# Patient Record
Sex: FEMALE | Race: AMERICAN INDIAN OR ALASKA NATIVE
[De-identification: names, ages, dates, MRNs, and addresses within clinical notes are randomized per-mention and may not be internally consistent; named-entity substitution may affect disease eponyms.]

---

## 2020-01-27 LAB
BAND NEUTROPHILS # (MANUAL): 0 K/MM3
HCT VFR BLD CALC: 38 % (ref 30.3–42.9)
HGB BLD-MCNC: 12.6 GM/DL (ref 10.1–14.3)
MCHC RBC AUTO-ENTMCNC: 33 % (ref 30–34)
MCV RBC AUTO: 95 FL (ref 79–97)
MYELOCYTES # (MANUAL): 0 K/MM3
PLATELET # BLD: 342 K/MM3 (ref 140–440)
PROMYELOCYTES # (MANUAL): 0 K/MM3
RBC # BLD AUTO: 4 M/MM3 (ref 3.65–5.03)
TOTAL CELLS COUNTED BLD: 100

## 2020-01-28 ENCOUNTER — HOSPITAL ENCOUNTER (INPATIENT)
Dept: HOSPITAL 5 - 3A | Age: 50
LOS: 1 days | Discharge: HOME | DRG: 743 | End: 2020-01-29
Attending: OBSTETRICS & GYNECOLOGY | Admitting: OBSTETRICS & GYNECOLOGY
Payer: MEDICAID

## 2020-01-28 DIAGNOSIS — D64.9: ICD-10-CM

## 2020-01-28 DIAGNOSIS — D25.1: Primary | ICD-10-CM

## 2020-01-28 PROCEDURE — 86900 BLOOD TYPING SEROLOGIC ABO: CPT

## 2020-01-28 PROCEDURE — 0UT70ZZ RESECTION OF BILATERAL FALLOPIAN TUBES, OPEN APPROACH: ICD-10-PCS | Performed by: OBSTETRICS & GYNECOLOGY

## 2020-01-28 PROCEDURE — 0UT90ZZ RESECTION OF UTERUS, OPEN APPROACH: ICD-10-PCS | Performed by: OBSTETRICS & GYNECOLOGY

## 2020-01-28 PROCEDURE — 80048 BASIC METABOLIC PNL TOTAL CA: CPT

## 2020-01-28 PROCEDURE — 85025 COMPLETE CBC W/AUTO DIFF WBC: CPT

## 2020-01-28 PROCEDURE — 85018 HEMOGLOBIN: CPT

## 2020-01-28 PROCEDURE — 86901 BLOOD TYPING SEROLOGIC RH(D): CPT

## 2020-01-28 PROCEDURE — 84703 CHORIONIC GONADOTROPIN ASSAY: CPT

## 2020-01-28 PROCEDURE — 86850 RBC ANTIBODY SCREEN: CPT

## 2020-01-28 PROCEDURE — 88307 TISSUE EXAM BY PATHOLOGIST: CPT

## 2020-01-28 PROCEDURE — C9250 ARTISS FIBRIN SEALANT: HCPCS

## 2020-01-28 PROCEDURE — 36415 COLL VENOUS BLD VENIPUNCTURE: CPT

## 2020-01-28 PROCEDURE — C1765 ADHESION BARRIER: HCPCS

## 2020-01-28 PROCEDURE — 85014 HEMATOCRIT: CPT

## 2020-01-28 PROCEDURE — 85007 BL SMEAR W/DIFF WBC COUNT: CPT

## 2020-01-28 RX ADMIN — HYDROCODONE BITARTRATE AND ACETAMINOPHEN PRN EACH: 5; 325 TABLET ORAL at 21:29

## 2020-01-28 RX ADMIN — HYDROMORPHONE HYDROCHLORIDE PRN MG: 1 INJECTION, SOLUTION INTRAMUSCULAR; INTRAVENOUS; SUBCUTANEOUS at 12:15

## 2020-01-28 RX ADMIN — HYDROMORPHONE HYDROCHLORIDE PRN MG: 1 INJECTION, SOLUTION INTRAMUSCULAR; INTRAVENOUS; SUBCUTANEOUS at 11:48

## 2020-01-28 RX ADMIN — HYDROMORPHONE HYDROCHLORIDE PRN MG: 1 INJECTION, SOLUTION INTRAMUSCULAR; INTRAVENOUS; SUBCUTANEOUS at 12:26

## 2020-01-28 RX ADMIN — ONDANSETRON PRN MG: 2 INJECTION INTRAMUSCULAR; INTRAVENOUS at 19:48

## 2020-01-28 RX ADMIN — HYDROMORPHONE HYDROCHLORIDE PRN MG: 1 INJECTION, SOLUTION INTRAMUSCULAR; INTRAVENOUS; SUBCUTANEOUS at 12:03

## 2020-01-28 RX ADMIN — KETOROLAC TROMETHAMINE SCH MG: 30 INJECTION, SOLUTION INTRAMUSCULAR at 17:37

## 2020-01-28 RX ADMIN — DOCUSATE SODIUM SCH MG: 100 CAPSULE, LIQUID FILLED ORAL at 21:29

## 2020-01-28 RX ADMIN — CEFAZOLIN SCH MLS/HR: 330 INJECTION, POWDER, FOR SOLUTION INTRAMUSCULAR; INTRAVENOUS at 17:39

## 2020-01-28 NOTE — ANESTHESIA CONSULTATION
Anesthesia Consult and Med Hx


Date of service: 01/28/20





- Airway


Anesthetic Teeth Evaluation: Good


ROM Head & Neck: Adequate


Mental/Hyoid Distance: Adequate


Mallampati Class: Class II


Intubation Access Assessment: Probably Good





- Pulmonary Exam


CTA: Yes





- Cardiac Exam


Cardiac Exam: RRR





- Pre-Operative Health Status


ASA Pre-Surgery Classification: ASA1


Proposed Anesthetic Plan: General





- Pre-Anesthesia Comment


Pre-Anesthesia Comments: Patient does not want TAP block.





- Central Nervous System


Hx Psychiatric Problems: No





- Other Systems


Hx Cancer: No





- Additional Comments


Anesthesia Medical History Comments: Healthy

## 2020-01-28 NOTE — HISTORY AND PHYSICAL REPORT
History of Present Illness


Date of examination: 20


Date of admission: 


20 07:56





Chief complaint: 





Symptomatic uterine fibroids


History of present illness: 





Pt is a 48yo BF  LMP 19 presents for surgical evaluation and treatment

of uterine fibroids.  She complains of pelvic pain, and pelvic u/s showed an 

enlarged uterus 14 x 6.7 x 8.7cm with multiple fibroids.  She desires a Total 

Abdominal Hysterectomy with ovarian conservation.





Past History


Past Medical History: no pertinent history


Past Surgical History: no surgical history


GYN History: fibroids


Family/Genetic History: none


Social history: no significant social history, single





Medications and Allergies


                                    Allergies











Allergy/AdvReac Type Severity Reaction Status Date / Time


 


No Known Allergies Allergy   Verified 20 15:00











                                Home Medications











 Medication  Instructions  Recorded  Confirmed  Last Taken  Type


 


No Known Home Medications [No  20 Unknown History





Reported Home Medications]     











Active Meds: 


Active Medications





Acetaminophen/Hydrocodone Bitart (Norco 5/325)  2 each PO ONCE PRN


   PRN Reason: Pain, Moderate (4-6)


Hydromorphone HCl (Dilaudid)  0.5 mg IV Q10MIN PRN


   PRN Reason: Pain , Severe (7-10)


Lactated Ringer's (Lactated Ringers)  1,000 mls @ 100 mls/hr IV AS DIRECT SUKHDEEP


Midazolam HCl (Versed)  2 mg IV PREOP NR


   Stop: 20 23:59


Ondansetron HCl (Zofran)  4 mg IV ONCE PRN


   PRN Reason: Nausea And Vomiting











Review of Systems


All systems: negative





- Vital Signs


Vital signs: 


                                   Vital Signs











Temp Pulse Resp BP Pulse Ox


 


 97.9 F   92 H  20   138/94   97 


 


 20 10:50  20 10:50  20 10:50  20 10:50  20 10:50








                                        











Temp Pulse Resp BP Pulse Ox


 


 97.9 F   92 H  20   138/94   97 


 


 20 10:50  20 10:50  20 10:50  20 10:50  20 10:50














- Physical Exam


Breasts: Positive: deferred


Cardiovascular: Regular rate


Lungs: Positive: Clear to auscultation


Abdomen: Positive: normal appearance


Genitourinary (Female): Positive: normal external genitalia


Uterus: Positive: enlarged


Extremities: Positive: normal





Results


Result Diagrams: 


                                 20 06:37





                                 20 06:37


                              Abnormal lab results











  20 Range/Units





  11:00 


 


WBC  4.4 L  (4.5-11.0)  K/mm3


 


Monocytes % (Manual)  18.0 H  (0.0-7.3)  %








All other labs normal.





Ultrasound: report reviewed





Assessment and Plan





- Patient Problems


(1) Fibroid uterus


Onset Date: 20   Current Visit: Yes   Status: Resolved   


Qualifiers: 


   Uterine leiomyoma location: intramural and subserous   Qualified Code(s): 

D25.1 - Intramural leiomyoma of uterus; D25.2 - Subserosal leiomyoma of uterus  




Plan to address problem: 


A:  Symptomatic uterine fibroids





 P:  Admit for a Total Abdominal Hysterectomy with Bilateral Salpingectomy

## 2020-01-28 NOTE — OPERATIVE REPORT
Operative Report


Operative Report: 








Date of procedure: 01/28/2020





Pre-operative diagnosis: 1.  Symptomatic uterine fibroids     





Post-operative diagnosis: Same 





Procedure name(s): 1.  Total abdominal hysterectomy   2.  Bilateral 

salpingectomy





Surgeon: Nito Gaspar MD





Assistant: Selma Qureshi CSA





Anesthesia: MADELINE Block followed by general endotracheal intubation





EBL: 500 mL's 





Findings: A 16 -18 week size multi-myomatous uterus. Normal tubes and ovaries 

bilaterally.  





Procedure: After the patient was first correctly identified and after MADELINE block

and general anesthesia was administered she was prepped and draped in usual in 

the usual sterile fashion and placed in the dorsolithotomy position.  The skin 

knife was used to make a transverse skin incision. The incision was extended 

down to the layer of the fascia which was nicked in the midline and extended 

laterally using Bovie cautery.  The rectus muscles were dissected off the rectus

fascia both superiorly and inferiorly, the rectus bellies  in the 

midline and the peritoneum was entered under direct visualization.  Exploration 

of the pelvic organs found the uterus to be enlarged, extending just below the 

umbilicus. The left and right fallopian tubes were normal but elongated and the 

ovaries were normal bilaterally.  The uterus was elevated in the surgical field 

by the use of a single-toothed tenaculum.  Next the bowels were packed back and 

the round ligaments were grasped, cauterized and cut using the Enseal device.  

The utero-ovarian ligament was clamped, cauterized and cut, thus freeing the 

right ovary from the right uterine sidewall.  The same procedure was performed 

on the left.  The left round ligament was clamped, cauterized and cut using the 

Enseal device, and the left utero-ovarian ligament was clamped, cauterized and 

cut thus freeing the left ovary and the left fallopian tube from the left 

uterine sidewall.  The fallopian tubes were also cut along the mesosalpinx, thus

freeing the tubes bilaterally. The uterine vessels were then skeletonized 

bilaterally, and the bladder flap was taken down anteriorly over a large lower 

uterine segment fibroid. The uterine vessels were then doubly clamped cut and 

suture ligated bilaterally, and the cardinal ligaments were sequentially clamped

cut and suture ligated down to the level of the uterosacral ligaments.  The 

cervix was then amputated from the vaginal cuff and the specimen was handed off 

the surgical field.  The vaginal cuff was then made hemostatic using several 

sutures of 0 Vicryl suture in a figure-of-eight configuration.  After excellent 

hemostasis was assured copious amounts of irrigation was then performed.  The 

Tisseel sealant was then sprayed across the vaginal cuff and the superior 

pedicles bilaterally, and after excellent hemostasis was assured, the procedure 

was considered complete.  All instruments were removed from the abdomen, and the

peritoneum was closed using 0 Vicryl suture in a running interlocking fashion 

and the rectus muscles were also loosely re-approximated using 0 Vicryl suture 

in a figure-of-eight configuration.  The fascia was then re-approximated using 

#1 Vicryl suture in a running interlocking fashion, the subcutaneous layer made 

hemostatic using Bovie cautery and the skin edges re-approximated using 4-0 

Vicryl suture in a sub-cuticular fashion.  Patient tolerated the procedure well 

was transported to recovery room in stable condition.

## 2020-01-29 VITALS — SYSTOLIC BLOOD PRESSURE: 112 MMHG | DIASTOLIC BLOOD PRESSURE: 71 MMHG

## 2020-01-29 LAB
BUN SERPL-MCNC: 5 MG/DL (ref 7–17)
BUN/CREAT SERPL: 7 %
CALCIUM SERPL-MCNC: 8.7 MG/DL (ref 8.4–10.2)
HCT VFR BLD CALC: 31.6 % (ref 30.3–42.9)
HEMOLYSIS INDEX: 19
HGB BLD-MCNC: 10.8 GM/DL (ref 10.1–14.3)

## 2020-01-29 RX ADMIN — KETOROLAC TROMETHAMINE SCH MG: 30 INJECTION, SOLUTION INTRAMUSCULAR at 06:54

## 2020-01-29 RX ADMIN — HYDROCODONE BITARTRATE AND ACETAMINOPHEN PRN EACH: 5; 325 TABLET ORAL at 04:39

## 2020-01-29 RX ADMIN — HYDROCODONE BITARTRATE AND ACETAMINOPHEN PRN EACH: 5; 325 TABLET ORAL at 16:41

## 2020-01-29 RX ADMIN — KETOROLAC TROMETHAMINE SCH: 30 INJECTION, SOLUTION INTRAMUSCULAR at 16:08

## 2020-01-29 RX ADMIN — HYDROCODONE BITARTRATE AND ACETAMINOPHEN PRN EACH: 5; 325 TABLET ORAL at 21:33

## 2020-01-29 RX ADMIN — CEFAZOLIN SCH MLS/HR: 330 INJECTION, POWDER, FOR SOLUTION INTRAMUSCULAR; INTRAVENOUS at 06:13

## 2020-01-29 RX ADMIN — DOCUSATE SODIUM SCH MG: 100 CAPSULE, LIQUID FILLED ORAL at 10:20

## 2020-01-29 RX ADMIN — ONDANSETRON PRN MG: 2 INJECTION INTRAMUSCULAR; INTRAVENOUS at 04:39

## 2020-01-29 RX ADMIN — DOCUSATE SODIUM SCH MG: 100 CAPSULE, LIQUID FILLED ORAL at 21:33

## 2020-01-29 RX ADMIN — KETOROLAC TROMETHAMINE SCH: 30 INJECTION, SOLUTION INTRAMUSCULAR at 00:00

## 2020-01-29 NOTE — PROGRESS NOTE
Assessment and Plan





- Patient Problems


(1) Fibroid uterus


Onset Date: 01/28/20   Current Visit: Yes   Status: Resolved   


Qualifiers: 


   Uterine leiomyoma location: intramural and subserous   Qualified Code(s): 

D25.1 - Intramural leiomyoma of uterus; D25.2 - Subserosal leiomyoma of uterus  







(2) S/P SHANNAN (total abdominal hysterectomy)


Onset Date: 01/29/20   Current Visit: Yes   Status: Resolved   


Plan to address problem: 


A:  S/P SHANNAN - POD #1  Doing well


      Asymptomatic anemia - stable





 P:  Continue RPOC


      Anticipate discharge later today








Subjective





- Subjective


Date of service: 01/29/20


Principal diagnosis: s/p SHANNAN - POD #1


Interval history: 





Pt is s/p a Total Abdominal Hysterectomy with Bilateral salpingectomy, and 

feeling well.  She is tolerating a liquid diet without nausea or vomiting.  No 

flatus yet.


Patient reports: appetite normal, voiding normally, pain well controlled, 

ambulating normally, no dizzy ambulation, no flatus, no nauseated





Objective





- Vital Signs


Latest vital signs: 


                                   Vital Signs











  Temp Pulse Resp BP Pulse Ox


 


 01/29/20 08:38  98.2 F  97 H  20  100/61  88


 


 01/29/20 06:23  99.0 F  102 H  18  114/60  92


 


 01/29/20 05:31    18  


 


 01/29/20 02:26  98.8 F  102 H  18  118/80  95


 


 01/28/20 22:00  98.5 F  103 H  20  138/91  97


 


 01/28/20 16:28  98.9 F  91 H  12  145/95  96


 


 01/28/20 13:00  98.0 F  100 H  18  137/87  94


 


 01/28/20 12:30  99.1 F  99 H  18  135/84  95


 


 01/28/20 12:26    16  


 


 01/28/20 12:15   91 H  12  157/83  95


 


 01/28/20 12:03    14  


 


 01/28/20 12:00   86  17  145/81  97


 


 01/28/20 11:48    17  


 


 01/28/20 11:45   77  17  151/84  94


 


 01/28/20 11:40   84  20  157/86  95


 


 01/28/20 11:35   77  15  152/87  96


 


 01/28/20 11:28  98.4 F  80  16  158/81  96








                                Intake and Output











 01/28/20 01/29/20 01/29/20





 22:59 06:59 14:59


 


Intake Total 150  


 


Output Total 800 975 


 


Balance -600 -486 


 


Intake:   


 


  IV 50  


 


    ANCEF/NS 1 GM/50 ML 1 gm 50  





    In 50 ml @ 100 mls/hr IV   





    Q8H [facility] Rx#:[id number]   


 


  Oral 100  


 


Output:   


 


  Urine 800 975 


 


    Indwelling Catheter 800 975 


 


Other:   


 


  Total, Intake Amount 100  


 


  Total, Output Amount 800 375 


 


  Voiding Method Indwelling Catheter Indwelling Catheter 


 


  Weight  65.771 kg 














- Exam


Abdomen: Present: normal appearance, soft


Extremities: Present: normal


Incision: Present: normal, dry, intact





- Labs


Labs: 


                              Abnormal lab results











  01/29/20 Range/Units





  06:37 


 


BUN  5 L  (7-17)  mg/dL


 


Glucose  139 H  ()  mg/dL








                                Laboratory Tests











  01/27/20 01/27/20 01/28/20





  11:00 11:00 08:10


 


WBC  4.4 L  


 


RBC  4.00  


 


Hgb  12.6  


 


Hct  38.0  


 


MCV  95  


 


MCH  32  


 


MCHC  33  


 


RDW  14.0  


 


Plt Count  342  


 


Mono % (Auto)  Np  


 


Add Manual Diff  Complete  


 


Total Counted  100  


 


Seg Neuts % (Manual)  49.0  


 


Band Neutrophils %  0  


 


Lymphocytes % (Manual)  29.0  


 


Reactive Lymphs % (Man)  0  


 


Monocytes % (Manual)  18.0 H  


 


Eosinophils % (Manual)  3.0  


 


Basophils % (Manual)  1.0  


 


Metamyelocytes %  0  


 


Myelocytes %  0  


 


Promyelocytes %  0  


 


Blast Cells %  0  


 


Nucleated RBC %  Not Reportable  


 


Seg Neutrophils # Man  2.2  


 


Band Neutrophils #  0.0  


 


Lymphocytes # (Manual)  1.3  


 


Abs React Lymphs (Man)  0.0  


 


Monocytes # (Manual)  0.8  


 


Eosinophils # (Manual)  0.1  


 


Basophils # (Manual)  0.0  


 


Metamyelocytes #  0.0  


 


Myelocytes #  0.0  


 


Promyelocytes #  0.0  


 


Blast Cells #  0.0  


 


WBC Morphology  Not Reportable  


 


Hypersegmented Neuts  Not Reportable  


 


Hyposegmented Neuts  Not Reportable  


 


Hypogranular Neuts  Not Reportable  


 


Smudge Cells  Not Reportable  


 


Toxic Granulation  Not Reportable  


 


Toxic Vacuolation  Not Reportable  


 


Dohle Bodies  Not Reportable  


 


Pelger-Huet Anomaly  Not Reportable  


 


Pablo Rods  Not Reportable  


 


Platelet Estimate  Consistent w auto  


 


Clumped Platelets  Not Reportable  


 


Plt Clumps, EDTA  Not Reportable  


 


Large Platelets  Rare  


 


Giant Platelets  Not Reportable  


 


Platelet Satelliting  Not Reportable  


 


Plt Morphology Comment  Not Reportable  


 


RBC Morphology  Normal  


 


Dimorphic RBCs  Not Reportable  


 


Polychromasia  Not Reportable  


 


Hypochromasia  Not Reportable  


 


Poikilocytosis  Not Reportable  


 


Anisocytosis  Not Reportable  


 


Microcytosis  Not Reportable  


 


Macrocytosis  Not Reportable  


 


Spherocytes  Not Reportable  


 


Pappenheimer Bodies  Not Reportable  


 


Sickle Cells  Not Reportable  


 


Target Cells  Not Reportable  


 


Tear Drop Cells  Not Reportable  


 


Ovalocytes  Not Reportable  


 


Helmet Cells  Not Reportable  


 


Bowen-Armonk Bodies  Not Reportable  


 


Cabot Rings  Not Reportable  


 


Sita Cells  Not Reportable  


 


Bite Cells  Not Reportable  


 


Crenated Cell  Not Reportable  


 


Elliptocytes  Not Reportable  


 


Acanthocytes (Spur)  Not Reportable  


 


Rouleaux  Not Reportable  


 


Hemoglobin C Crystals  Not Reportable  


 


Schistocytes  Not Reportable  


 


Malaria parasites  Not Reportable  


 


Seth Bodies  Not Reportable  


 


Hem Pathologist Commnt  No  


 


Sodium   


 


Potassium   


 


Chloride   


 


Carbon Dioxide   


 


Anion Gap   


 


BUN   


 


Creatinine   


 


Estimated GFR   


 


BUN/Creatinine Ratio   


 


Glucose   


 


Calcium   


 


HCG, Qual   Negative 


 


Blood Type    A POSITIVE


 


Antibody Screen    Negative














  01/29/20 01/29/20





  06:37 06:37


 


WBC  


 


RBC  


 


Hgb  10.8 


 


Hct  31.6  D 


 


MCV  


 


MCH  


 


MCHC  


 


RDW  


 


Plt Count  


 


Mono % (Auto)  


 


Add Manual Diff  


 


Total Counted  


 


Seg Neuts % (Manual)  


 


Band Neutrophils %  


 


Lymphocytes % (Manual)  


 


Reactive Lymphs % (Man)  


 


Monocytes % (Manual)  


 


Eosinophils % (Manual)  


 


Basophils % (Manual)  


 


Metamyelocytes %  


 


Myelocytes %  


 


Promyelocytes %  


 


Blast Cells %  


 


Nucleated RBC %  


 


Seg Neutrophils # Man  


 


Band Neutrophils #  


 


Lymphocytes # (Manual)  


 


Abs React Lymphs (Man)  


 


Monocytes # (Manual)  


 


Eosinophils # (Manual)  


 


Basophils # (Manual)  


 


Metamyelocytes #  


 


Myelocytes #  


 


Promyelocytes #  


 


Blast Cells #  


 


WBC Morphology  


 


Hypersegmented Neuts  


 


Hyposegmented Neuts  


 


Hypogranular Neuts  


 


Smudge Cells  


 


Toxic Granulation  


 


Toxic Vacuolation  


 


Dohle Bodies  


 


Pelger-Huet Anomaly  


 


Pablo Rods  


 


Platelet Estimate  


 


Clumped Platelets  


 


Plt Clumps, EDTA  


 


Large Platelets  


 


Giant Platelets  


 


Platelet Satelliting  


 


Plt Morphology Comment  


 


RBC Morphology  


 


Dimorphic RBCs  


 


Polychromasia  


 


Hypochromasia  


 


Poikilocytosis  


 


Anisocytosis  


 


Microcytosis  


 


Macrocytosis  


 


Spherocytes  


 


Pappenheimer Bodies  


 


Sickle Cells  


 


Target Cells  


 


Tear Drop Cells  


 


Ovalocytes  


 


Helmet Cells  


 


Bowen-[place]onk Bodies  


 


Cabot Rings  


 


Sita Cells  


 


Bite Cells  


 


Crenated Cell  


 


Elliptocytes  


 


Acanthocytes (Spur)  


 


Rouleaux  


 


Hemoglobin C Crystals  


 


Schistocytes  


 


Malaria parasites  


 


Seth Bodies  


 


Hem Pathologist Commnt  


 


Sodium   139


 


Potassium   3.9


 


Chloride   100.6


 


Carbon Dioxide   26


 


Anion Gap   16


 


BUN   5 L


 


Creatinine   0.7


 


Estimated GFR   > 60


 


BUN/Creatinine Ratio   7


 


Glucose   139 H


 


Calcium   8.7


 


HCG, Qual  


 


Blood Type  


 


Antibody Screen

## 2020-01-29 NOTE — DISCHARGE SUMMARY
Providers





- Providers


Date of Admission: 


20 07:56





Date of discharge: 20


Attending physician: 


LAN SPICER





Primary care physician: 


PRIMARY CARE MD








Hospitalization


Reason for admission: other (Symptomatic uterine fibroids)


Procedure: other (Total Abdominal Hysterectomy)


Episiotomy: none


Laceration: none


Incision: normal, dry, intact


Other postpartum procedures: none


Postpartum complications: none


Discharge diagnosis: other (s/p SHANNAN)


Hospital course: 





Pt is a 50yo BF  LMP 19 who presented for surgical evaluation and 

treatment of uterine fibroids.  She complained of pelvic pain, and pelvic u/s 

showed an enlarged uterus 14 x 6.7 x 8.7cm with multiple fibroids.  She 

underwent an uncomplicated Total Abdominal Hysterectomy with Bilateral 

salpingectomy, and by POD #1 she was tolerating a reg diet without nausea or 

vomiting, ambulating and voiding without difficulty.  She was therefore 

discharged to home on POD #1 in stable condition.


Condition at discharge: Good


Disposition: DC-01 TO HOME OR SELFCARE





- Discharge Diagnoses


(1) Fibroid uterus


Status: Resolved   


Qualifiers: 


   Uterine leiomyoma location: intramural and subserous   Qualified Code(s): 

D25.1 - Intramural leiomyoma of uterus; D25.2 - Subserosal leiomyoma of uterus  







Plan





- Discharge Medications


Prescriptions: 


Ibuprofen [Motrin] 800 mg PO Q8HR PRN #30 tablet


 PRN Reason: Pain, Mild (1-3)


HYDROcodone/APAP 5-325 [Norco 5-325 mg TAB] 1 each PO Q6HR PRN #30 tablet


 PRN Reason: Pain, Moderate (4-6)





- Provider Discharge Summary


Activity: routine, no sex for 6 weeks, no heavy lifting 4 weeks, no strenuous 

exercise


Diet: routine


Instructions: routine


Additional instructions: 


[]  Smoking cessation referral if applicable(refer to patient education folder 

for contact #)


[]  Refer to [facility] Booklet








Call your doctor immediately for:


* Fever > 100.5


* Heavy vaginal bleeding ( >1 pad per hour)


* Severe persistent headache


* Shortness of breath


* Reddened, hot, painful area to leg or breast


* Drainage or odor from incision.





* Keep incision clean and dry at all times and follow doctor's instructions 

regarding bathing/showering











- Follow up plan


Follow up: 


PRIMARY CARE,MD [Primary Care Provider] - 7 Days


LAN SPICER MD [Staff Physician] - 7 Days


Forms:  [facility] Discharge Summary